# Patient Record
Sex: FEMALE | Race: BLACK OR AFRICAN AMERICAN | Employment: STUDENT | URBAN - METROPOLITAN AREA
[De-identification: names, ages, dates, MRNs, and addresses within clinical notes are randomized per-mention and may not be internally consistent; named-entity substitution may affect disease eponyms.]

---
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
## 2025-01-01 NOTE — ED PROVIDER NOTES
Simple for   Emergency Department Provider Note       PCP: Unknown, Provider, ANP   Age: 19 y.o.   Sex: female     DISPOSITION Decision To Discharge 12/24/2024 04:39:57 PM    ICD-10-CM    1. Facial weakness  R29.810     Right sided, no associated other symptoms          Medical Decision Making     Patient with probably 3 days of very subtle changes to the area to the right of the mouth.  No other facial abnormality is noted.  No other neuro abnormality is noted.  Patient does not have a headache.  CT scan is normal.  Patient with no baseline illnesses.  After discussion with patient patient wishes to have no further testing.  She is to use a very low threshold to return with any changes.  Patient acknowledges this and states that she will.     1 acute complicated illness or injury.  To use a VERY LOW threshold to return with any changes  I independently ordered and reviewed each unique test.    I reviewed external records: Reviewed old records        I interpreted the CT Scan CT scan showed no acute changes.              History     Patient does have history of Bell's palsy about 3 or 4 years ago.  He noticed about 2 to 3 days ago slight weakness to the right side of her lip.  No speech or vision change.  No weakness to either upper or lower extremity and no sensory or coordination issues.  No known tick bites.  Patient not pregnant.  No headache.  No vertigo.  No change or hearing.  No tinnitus.  No facial rash or redness.  Otherwise without complaint.    The history is provided by the patient.             ROS     Review of Systems   Constitutional:  Negative for chills and fever.   HENT:  Negative for dental problem, ear pain, facial swelling, sinus pressure, sore throat and trouble swallowing.    Respiratory: Negative.     Cardiovascular: Negative.    Gastrointestinal:  Negative for constipation, diarrhea and vomiting.   Musculoskeletal: Negative.    Neurological:  Negative for dizziness, seizures, speech